# Patient Record
Sex: FEMALE
[De-identification: names, ages, dates, MRNs, and addresses within clinical notes are randomized per-mention and may not be internally consistent; named-entity substitution may affect disease eponyms.]

---

## 2020-06-16 ENCOUNTER — DASHBOARD ENCOUNTERS (OUTPATIENT)
Age: 60
End: 2020-06-16

## 2020-06-17 ENCOUNTER — OFFICE VISIT (OUTPATIENT)
Dept: URBAN - METROPOLITAN AREA TELEHEALTH 2 | Facility: TELEHEALTH | Age: 60
End: 2020-06-17
Payer: COMMERCIAL

## 2020-06-17 DIAGNOSIS — K21.0 GERD WITH ESOPHAGITIS: ICD-10-CM

## 2020-06-17 DIAGNOSIS — K59.01 SLOW TRANSIT CONSTIPATION: ICD-10-CM

## 2020-06-17 DIAGNOSIS — Z12.11 SCREEN FOR COLON CANCER: ICD-10-CM

## 2020-06-17 DIAGNOSIS — R10.13 DYSPEPSIA: ICD-10-CM

## 2020-06-17 PROBLEM — 266433003: Status: ACTIVE | Noted: 2020-06-17

## 2020-06-17 PROBLEM — 35298007: Status: ACTIVE | Noted: 2020-06-17

## 2020-06-17 PROBLEM — 305058001: Status: ACTIVE | Noted: 2020-06-17

## 2020-06-17 PROBLEM — 162031009: Status: ACTIVE | Noted: 2020-06-17

## 2020-06-17 PROCEDURE — 1036F TOBACCO NON-USER: CPT | Performed by: INTERNAL MEDICINE

## 2020-06-17 PROCEDURE — 99204 OFFICE O/P NEW MOD 45 MIN: CPT | Performed by: INTERNAL MEDICINE

## 2020-06-17 PROCEDURE — G9903 PT SCRN TBCO ID AS NON USER: HCPCS | Performed by: INTERNAL MEDICINE

## 2020-06-17 RX ORDER — LISINOPRIL 10 MG/1
1 TABLET TABLET ORAL ONCE A DAY
Status: ACTIVE | COMMUNITY

## 2020-06-17 RX ORDER — LINACLOTIDE 72 UG/1
1 CAPSULE AT LEAST 30 MINUTES BEFORE THE FIRST MEAL OF THE DAY ON AN EMPTY STOMACH CAPSULE, GELATIN COATED ORAL ONCE A DAY
Qty: 90 | Refills: 3 | OUTPATIENT
Start: 2020-06-17 | End: 2021-06-12

## 2020-06-17 RX ORDER — OMEPRAZOLE 40 MG/1
1 CAPSULE 30 MINUTES BEFORE MORNING MEAL CAPSULE, DELAYED RELEASE ORAL BID
Qty: 180 | Refills: 3 | OUTPATIENT
Start: 2020-06-17

## 2020-06-17 NOTE — PHYSICAL EXAM GASTROINTESTINAL
Abdomen , soft, tenderness in the midepigastrium, no rebound pain, nondistended , no guarding or rigidity , no masses palpable , normal bowel sounds , Liver and Spleen , no hepatomegaly present , no hepatosplenomegaly , liver nontender , spleen not palpable

## 2020-06-17 NOTE — HPI-TODAY'S VISIT:
Pt presents for evaluation of severe acid reflux over the last 3 weeks and was seen by her primary care MD and was started on Omeprazole and she has noted no improvement in her symptoms. Since mid March, she has increased her amount as well. She notes that she drinks coffee and will eat late at night as well.

## 2020-07-14 ENCOUNTER — TELEPHONE ENCOUNTER (OUTPATIENT)
Dept: URBAN - METROPOLITAN AREA CLINIC 92 | Facility: CLINIC | Age: 60
End: 2020-07-14

## 2020-07-14 ENCOUNTER — OFFICE VISIT (OUTPATIENT)
Dept: URBAN - METROPOLITAN AREA SURGERY CENTER 16 | Facility: SURGERY CENTER | Age: 60
End: 2020-07-14
Payer: COMMERCIAL

## 2020-07-14 DIAGNOSIS — K31.89 ACQUIRED DEFORMITY OF PYLORUS: ICD-10-CM

## 2020-07-14 DIAGNOSIS — K29.60 ADENOPAPILLOMATOSIS GASTRICA: ICD-10-CM

## 2020-07-14 DIAGNOSIS — K21.0 BILE REFLUX ESOPHAGITIS: ICD-10-CM

## 2020-07-14 PROCEDURE — G8907 PT DOC NO EVENTS ON DISCHARG: HCPCS | Performed by: INTERNAL MEDICINE

## 2020-07-14 PROCEDURE — 43239 EGD BIOPSY SINGLE/MULTIPLE: CPT | Performed by: INTERNAL MEDICINE

## 2020-07-14 RX ORDER — LISINOPRIL 10 MG/1
1 TABLET TABLET ORAL ONCE A DAY
Status: ACTIVE | COMMUNITY

## 2020-07-14 RX ORDER — OMEPRAZOLE 40 MG/1
1 CAPSULE 30 MINUTES BEFORE MORNING MEAL CAPSULE, DELAYED RELEASE ORAL BID
Qty: 180 | Refills: 3 | Status: ACTIVE | COMMUNITY
Start: 2020-06-17

## 2020-07-14 RX ORDER — LINACLOTIDE 72 UG/1
1 CAPSULE AT LEAST 30 MINUTES BEFORE THE FIRST MEAL OF THE DAY ON AN EMPTY STOMACH CAPSULE, GELATIN COATED ORAL ONCE A DAY
Qty: 90 | Refills: 3 | Status: ACTIVE | COMMUNITY
Start: 2020-06-17 | End: 2021-06-12

## 2020-09-08 ENCOUNTER — OFFICE VISIT (OUTPATIENT)
Dept: URBAN - METROPOLITAN AREA SURGERY CENTER 16 | Facility: SURGERY CENTER | Age: 60
End: 2020-09-08

## 2022-09-20 NOTE — PHYSICAL EXAM PSYCH:
ED Resident Physician Note      Patient : Gracy Malcolm Age: 66 year old Sex: female   MRN: [id number] Encounter Date: 9/19/2022      History     Chief Complaint   Patient presents with   • Arm Pain     Pt to ED with c.o L arm weakness and pain x 3-4 days. Pt states her arm in skinnier than the other one. Pt was taking prednisone in that past month for welts on her b/l arms.        HPI:  Gracy Malcolm 66 year old female with PMH Graves' disease presenting with left arm pain.  Patient notes symptoms started 1 week ago with pain especially on grasping and lifting anything in the left hand.  Denies any numbness or tingling in her hand, but endorsing some weakness especially when lifting.  Patient able to move arms above head without any problems.  Denying any new rashes since last admission 2 weeks ago showing urticarial rash.  Denying any palpitations, eye bulging, constipation, diarrhea, new rashes.    No Known Allergies    Current Discharge Medication List      Prior to Admission Medications    Details   methIMAzole (TAPAZOLE) 5 MG tablet Take 1 tablet by mouth daily.  Qty: 60 tablet, Refills: 3      amLODIPine (NORVASC) 5 MG tablet Take 1 tablet by mouth daily.  Qty: 30 tablet, Refills: 3      sertraline (ZOLOFT) 50 MG tablet Take 1 tablet by mouth daily.  Qty: 30 tablet, Refills: 11             Current Discharge Medication List          Past Medical History:   Diagnosis Date   • Coronary artery disease    • Thyroid condition      As stated above    No past surgical history on file.  As stated above    No family history on file.    Social History     Tobacco Use   • Smoking status: Never Smoker   • Smokeless tobacco: Never Used   Substance Use Topics   • Alcohol use: Yes     As stated above    Review of Systems:  Const - no fevers, no chills, no fatigue  Head - no headache, no trauma  Eyes - no pain, no vision changes  ENT - no rhinorrhea, no throat pain  CV - no CP, no palpitations  Resp - no sob, no cough  Skin - no  normal mood with appropriate affect lacerations, no rashes  GI - no ab pain, no n/v/d   - no dysuria, no hematuria  MSK - +myalgia, no joint pain    Physical Exam     ED Triage Vitals [09/19/22 1629]   ED Triage Vitals Group      Temp 97.5 °F (36.4 °C)      Heart Rate 82      Resp 18      BP (!) 178/93      SpO2 97 %      EtCO2 mmHg       Height       Weight       Weight Scale Used       BMI (Calculated)       IBW/kg (Calculated)        Gen -anxious appearing, WDWN  Head - NCAT  EENT - EOMI, PERRLA, naris clear, oropharynx clear, no exophthalmos  Neck - FROM, nontender  CV - RRR, no m/r/g, radial pulses 2+, pedal pulses 2+, no edema  Resp - CTAB, no wheeze, no crackles, no cough  Abd - soft, NDNT, no rebound or guarding  Ext - ARANA, minimal tenderness over proximal left forearm, compartments soft, pain with flexion of left hand and left forearm, minimal pain with flexion of left elbow to left forearm  Integ - warm, well perfused, no rashes  Neuro - Alert, oriented, appropriately conversant, Normal gait, cranial nerve exam intact: normal facial sensation, normal facial strength, no facial asymmetry, no tongue/uvula/palatal deviation, vision grossly intact        Lab Results     Results for orders placed or performed during the hospital encounter of 09/19/22   Comprehensive Metabolic Panel   Result Value Ref Range    Fasting Status      Sodium 139 135 - 145 mmol/L    Potassium 3.9 3.4 - 5.1 mmol/L    Chloride 103 97 - 110 mmol/L    Carbon Dioxide 26 21 - 32 mmol/L    Anion Gap 14 7 - 19 mmol/L    Glucose 121 (H) 70 - 99 mg/dL    BUN 13 6 - 20 mg/dL    Creatinine 0.88 0.51 - 0.95 mg/dL    Glomerular Filtration Rate 73 >=60    BUN/ Creatinine Ratio 15 7 - 25    Calcium 9.3 8.4 - 10.2 mg/dL    Bilirubin, Total 0.5 0.2 - 1.0 mg/dL    GOT/AST 23 <=37 Units/L    GPT/ALT 18 <64 Units/L    Alkaline Phosphatase 94 45 - 117 Units/L    Albumin 3.3 (L) 3.6 - 5.1 g/dL    Protein, Total 7.6 6.4 - 8.2 g/dL    Globulin 4.3 (H) 2.0 - 4.0 g/dL    A/G Ratio 0.8 (L) 1.0 -  2.4   TROPONIN I, HIGH SENSITIVITY   Result Value Ref Range    Troponin I, High Sensitivity 7 <52 ng/L   CBC with Automated Differential (performable only)   Result Value Ref Range    WBC 6.3 4.2 - 11.0 K/mcL    RBC 4.59 4.00 - 5.20 mil/mcL    HGB 12.9 12.0 - 15.5 g/dL    HCT 38.7 36.0 - 46.5 %    MCV 84.3 78.0 - 100.0 fl    MCH 28.1 26.0 - 34.0 pg    MCHC 33.3 32.0 - 36.5 g/dL    RDW-CV 14.1 11.0 - 15.0 %    RDW-SD 43.3 39.0 - 50.0 fL     140 - 450 K/mcL    NRBC 0 <=0 /100 WBC    Neutrophil, Percent 58 %    Lymphocytes, Percent 29 %    Mono, Percent 11 %    Eosinophils, Percent 2 %    Basophils, Percent 0 %    Immature Granulocytes 0 %    Absolute Neutrophils 3.7 1.8 - 7.7 K/mcL    Absolute Lymphocytes 1.8 1.0 - 4.0 K/mcL    Absolute Monocytes 0.7 0.3 - 0.9 K/mcL    Absolute Eosinophils  0.1 0.0 - 0.5 K/mcL    Absolute Basophils 0.0 0.0 - 0.3 K/mcL    Absolute Immmature Granulocytes 0.0 0.0 - 0.2 K/mcL   Thyroid Stimulating Hormone Reflex   Result Value Ref Range    TSH 1.750 0.350 - 5.000 mcUnits/mL         Radiology Results     Imaging Results          XR CHEST PA AND LATERAL 2 VIEWS (Final result)  Result time 09/19/22 17:01:51    Final result                 Impression:    1. No evidence for an acute chest process.     Electronically Signed by: KEDAR MACIAS M.D.   Signed on: 9/19/2022 5:01 PM                Narrative:    EXAM: XR CHEST PA AND LATERAL 2 VIEWS     CLINICAL INDICATION:   Pt in ED with L arm weakness and pain for 3-4 days.      COMPARISON: Chest x-ray 06/07/2012.    FINDINGS: Lung fields are clear. Cardiomediastinal silhouette and pulmonary  vasculature are within normal limits. Osseous structures are unremarkable  for age.                                XR CERVICAL SPINE 2 OR 3 VIEWS (Final result)  Result time 09/19/22 17:14:55    Final result                 Impression:    FINDINGS AND IMPRESSION:     Normal alignment of the cervical spine.  Mild loss of disc space height  and  osteophyte formation in the lower cervical spine, worst at C5-C6.   Prevertebral soft tissues are normal in thickness.     Electronically Signed by: KEDAR MACIAS M.D.   Signed on: 9/19/2022 5:14 PM                Narrative:    EXAM: XR CERVICAL SPINE 2 OR 3 VIEWS    CLINICAL INDICATION: Weakness to left arm.    COMPARISON: None.                                ED Medication Orders (From admission, onward)    Ordered Start     Status Ordering Provider    09/19/22 2144 09/19/22 2145  ibuprofen (MOTRIN) tablet 400 mg  ONCE         Last MAR action: Given FLY PERSAUD          [facility]    66-year-old past medical history of Graves' disease presenting pain to her left arm, and other complaints.  Left arm pain likely musculoskeletal given exam and reproducibility will trial ibuprofen for pain.  Patient also noting frustrations with finding follow-up and wanting to obtain many diagnoses for multiple complaints from the ED.  She states she has difficulty following up with specialist especially an allergist or rheumatologist for prior urticarial disease stating that her insurance will not cover it.  Patient not complaining of any acute or emergent concerns.  Will provide additional resources for insurance and follow-up to ensure that patient has good outpatient resources and primary care follow-up.       Procedures     Clinical Impression     1. Musculoskeletal pain    Disposition        There is no disposition no dispo time  There is no comment      Fly Persaud MD   9/19/2022 10:26 PM        Fly Persaud MD  Resident  09/19/22 [id number]